# Patient Record
Sex: MALE | Race: WHITE
[De-identification: names, ages, dates, MRNs, and addresses within clinical notes are randomized per-mention and may not be internally consistent; named-entity substitution may affect disease eponyms.]

---

## 2021-05-26 ENCOUNTER — HOSPITAL ENCOUNTER (OUTPATIENT)
Dept: HOSPITAL 33 - SDC | Age: 51
Discharge: HOME | End: 2021-05-26
Attending: FAMILY MEDICINE
Payer: COMMERCIAL

## 2021-05-26 VITALS — DIASTOLIC BLOOD PRESSURE: 68 MMHG | SYSTOLIC BLOOD PRESSURE: 110 MMHG | HEART RATE: 68 BPM

## 2021-05-26 VITALS — OXYGEN SATURATION: 98 %

## 2021-05-26 DIAGNOSIS — I10: ICD-10-CM

## 2021-05-26 DIAGNOSIS — K57.30: ICD-10-CM

## 2021-05-26 DIAGNOSIS — D12.5: ICD-10-CM

## 2021-05-26 DIAGNOSIS — Z12.11: Primary | ICD-10-CM

## 2021-05-26 PROCEDURE — 88305 TISSUE EXAM BY PATHOLOGIST: CPT

## 2021-05-26 NOTE — OP
SURGERY DATE/TIME:  05/26/2021  0734    



PREOPERATIVE DIAGNOSIS:  Screening colonoscopy.



POSTOPERATIVE DIAGNOSES:    

1) Sigmoid colon polyp. 

2) Sigmoid diverticulosis.



PROCEDURE:    Colonoscopy. 



SURGEON:  Mateo Upton M.D.



ANESTHESIA:  MAC by Haris Polo CRNA. 



ESTIMATED BLOOD LOSS:  Minimal.



SPECIMENS:  Hot forceps polypectomy from the sigmoid colon. 



DESCRIPTION OF PROCEDURE:  After informed written consent was obtained, the patient was 
taken to the endoscopy suite. He was placed in left lateral decubitus position and 
anesthesia was titrated to desired level of consciousness. Digital rectal exam was 
performed and showed normal sphincter tone and no internal lesions. The scope was inserted 
into the rectum and sequentially the entire colonic mucosa was traversed. The level of 
cecum was reached and verified with direct visualization of ileocecal valve. Upon 
withdrawal there was some diverticula noted mostly in the sigmoid colon. There was sessile 
polyp which was removed with hot forceps, grasped, cauterized and removed in piecemeal 
fashion with no significant bleeding. The entire lesion appeared to be adequately removed 
with no complications. Upon further withdrawal no other lesions were encountered. 
Retroflexion was performed prior to withdrawal and was within normal limits. The scope was 
removed and the patient was transferred to the recovery room in good condition. I have 
advised that he follow up in a week for pathology report.